# Patient Record
Sex: FEMALE | Race: WHITE | Employment: OTHER | URBAN - METROPOLITAN AREA
[De-identification: names, ages, dates, MRNs, and addresses within clinical notes are randomized per-mention and may not be internally consistent; named-entity substitution may affect disease eponyms.]

---

## 2021-09-13 ENCOUNTER — OFFICE VISIT (OUTPATIENT)
Dept: VASCULAR SURGERY | Facility: CLINIC | Age: 71
End: 2021-09-13
Payer: MEDICARE

## 2021-09-13 DIAGNOSIS — I83.811 VARICOSE VEINS OF RIGHT LOWER EXTREMITY WITH PAIN: Primary | ICD-10-CM

## 2021-09-13 PROCEDURE — 99203 OFFICE O/P NEW LOW 30 MIN: CPT | Performed by: SURGERY

## 2021-09-13 RX ORDER — ROSUVASTATIN CALCIUM 10 MG/1
TABLET, COATED ORAL
COMMUNITY
Start: 2021-07-22

## 2021-09-13 RX ORDER — LOSARTAN POTASSIUM 50 MG/1
TABLET ORAL
COMMUNITY
Start: 2021-08-02

## 2021-09-13 NOTE — LETTER
2021         RE: Maddy Roa  [address] [facility] [address]        Dear Colleague,    Thank you for referring your patient, Maddy Roa, to the [facility] [place]. Please see a copy of my visit note below.     [facility]: Evelia Roa comes to see us today for evaluation of her painful varicosities.  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  Saphenofemoral junction on the right was competent at its origin but the GSV was incompetent from the origin of the proximal calf giving off the multiple varicosities.  Lesser saphenous vein was normal and there are no incompetent perforators.  Connor's cyst was noted.  She had worn compression for multiple months before this and intermittently since.  Decided against any treatment at that time.      However, these have progressively worsened over time.  Intermittent use of compression again but has worn these in the past.  Increasing distal varicosities noted with more discomfort as the day progresses.  A few varicosities in the left leg along with bilateral spider veins that are asymptomatic.    No history of phlebitis/DVT/bleeding/ulcerations.  Mild swelling at the end of the day noted.      PMH: Medications: Protonix,Prolia, Cozaar, Crestor    Medical: Hypertension    Hyperlipidemia on statin--recent LDL= 72    GERD    Osteoporosis    Normal renal function with recent serum creatinine= 0.70    No diabetes with recent A1c= 4.9      Surgical: Cholecystectomy        Shoulder surgery    Right total knee replacement approximately  with good      results.  Recommend left knee replacement in the near future     --All with good results.  No anesthetic or bleeding  issues     Non-smoker.    ROS: Unremarkable except for degenerative arthritis of her knees.  Right knee replacement is done fairly well and looking forward to her left knee replacement surgery with Dr. Collier. Spends the winter months in [place].  Both her daughters have required vein surgery.  No history of clotting disorders.    Exam: Alert and appropriate.  Normal affect.   HEENT= unremarkable   Chest= clear to auscultation   Cardiovascular= regular rate   +3 DP/PT pulses bilaterally   Right leg with 4 to 5 mm varicosity in the proximal medial calf.  Over the medial mid tibia extending down the ankle dilated varicosities noted.  Multiple spider veins appreciated.  Very mild ankle edema.  No ulcers.  Mild skin discoloration but not necessarily related to venous insufficiency.     Left leg with a 2 mm varicosity just below the patella and multiple scattered spider veins.  Minimal swelling.  Normal sensation bilaterally.    Bilateral VCSS= 4   CEAP:C4 (? 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  The involved varicosities were appreciated earlier but are more prominent and larger now with more tortuosity.  She has tried compression for well over 3 months over the years with very little improvement.    We will need to repeat the venous duplex ultrasound but obviously his would not improve with incompetence of the greater saphenous vein.  We will make sure that deep venous insufficiency has not worsened.      I would recommend she undergo closure of the entire right greater saphenous vein from the saphenofemoral junction down to the ankle where there may be some temporary or permanent numbness of the greater saphenous nerve that we discussed.  Also cosmetic stab phlebectomies on the calf varicosities.  We discussed our procedure along with the tumescent anesthetic, compression protocol, duplex follow-up.  No desire at this time to treat her spider veins.      Patient will  undergo prophylactic antibiotics due to her knee replacement with amoxicillin 2000 mg just before the procedure.      I will call her with the results of the duplex ultrasound since this will only confirm our previous study not change our recommendations for venous surgery.  Cheko Butler MD  .  CC  Patient Care Team:  Sara Milner as PCP - General            Again, thank you for allowing me to participate in the care of your patient.        Sincerely,        Cheko Butler

## 2021-09-13 NOTE — PROGRESS NOTES
[facility]: Evelia Roa comes to see us today for evaluation of her painful varicosities.  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  Saphenofemoral junction on the right was competent at its origin but the GSV was incompetent from the origin of the proximal calf giving off the multiple varicosities.  Lesser saphenous vein was normal and there are no incompetent perforators.  Connor's cyst was noted.  She had worn compression for multiple months before this and intermittently since.  Decided against any treatment at that time.      However, these have progressively worsened over time.  Intermittent use of compression again but has worn these in the past.  Increasing distal varicosities noted with more discomfort as the day progresses.  A few varicosities in the left leg along with bilateral spider veins that are asymptomatic.    No history of phlebitis/DVT/bleeding/ulcerations.  Mild swelling at the end of the day noted.      PMH: Medications: Protonix,Prolia, Cozaar, Crestor    Medical: Hypertension    Hyperlipidemia on statin--recent LDL= 72    GERD    Osteoporosis    Normal renal function with recent serum creatinine= 0.70    No diabetes with recent A1c= 4.9      Surgical: Cholecystectomy        Shoulder surgery    Right total knee replacement approximately  with good      results.  Recommend left knee replacement in the near future     --All with good results.  No anesthetic or bleeding issues     Non-smoker.    ROS: Unremarkable except for degenerative arthritis of her knees.  Right knee replacement is done fairly well and looking forward to her left knee replacement surgery with Dr. Collier. Spends the winter months in [place].  Both her  daughters have required vein surgery.  No history of clotting disorders.    Exam: Alert and appropriate.  Normal affect.   HEENT= unremarkable   Chest= clear to auscultation   Cardiovascular= regular rate   +3 DP/PT pulses bilaterally   Right leg with 4 to 5 mm varicosity in the proximal medial calf.  Over the medial mid tibia extending down the ankle dilated varicosities noted.  Multiple spider veins appreciated.  Very mild ankle edema.  No ulcers.  Mild skin discoloration but not necessarily related to venous insufficiency.     Left leg with a 2 mm varicosity just below the patella and multiple scattered spider veins.  Minimal swelling.  Normal sensation bilaterally.    Bilateral VCSS= 4   CEAP:C4 (? 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  The involved varicosities were appreciated earlier but are more prominent and larger now with more tortuosity.  She has tried compression for well over 3 months over the years with very little improvement.    We will need to repeat the venous duplex ultrasound but obviously his would not improve with incompetence of the greater saphenous vein.  We will make sure that deep venous insufficiency has not worsened.      I would recommend she undergo closure of the entire right greater saphenous vein from the saphenofemoral junction down to the ankle where there may be some temporary or permanent numbness of the greater saphenous nerve that we discussed.  Also cosmetic stab phlebectomies on the calf varicosities.  We discussed our procedure along with the tumescent anesthetic, compression protocol, duplex follow-up.  No desire at this time to treat her spider veins.      Patient will undergo prophylactic antibiotics due to her knee replacement with amoxicillin 2000 mg just before the procedure.      I will call her with the results of the duplex ultrasound since this will only confirm our previous study not change our recommendations for  venous surgery.  Cheko Butler MD  .  CC  Patient Care Team:  Sara Mliner as PCP - General